# Patient Record
Sex: FEMALE | Race: WHITE | HISPANIC OR LATINO
[De-identification: names, ages, dates, MRNs, and addresses within clinical notes are randomized per-mention and may not be internally consistent; named-entity substitution may affect disease eponyms.]

---

## 2019-09-29 ENCOUNTER — TRANSCRIPTION ENCOUNTER (OUTPATIENT)
Age: 54
End: 2019-09-29

## 2021-06-09 PROBLEM — Z00.00 ENCOUNTER FOR PREVENTIVE HEALTH EXAMINATION: Status: ACTIVE | Noted: 2021-06-09

## 2021-06-11 ENCOUNTER — APPOINTMENT (OUTPATIENT)
Dept: PULMONOLOGY | Facility: CLINIC | Age: 56
End: 2021-06-11
Payer: MEDICAID

## 2021-06-11 VITALS
BODY MASS INDEX: 29.64 KG/M2 | WEIGHT: 151 LBS | TEMPERATURE: 97 F | OXYGEN SATURATION: 94 % | HEIGHT: 60 IN | DIASTOLIC BLOOD PRESSURE: 80 MMHG | RESPIRATION RATE: 16 BRPM | SYSTOLIC BLOOD PRESSURE: 142 MMHG | HEART RATE: 90 BPM

## 2021-06-11 DIAGNOSIS — Z86.39 PERSONAL HISTORY OF OTHER ENDOCRINE, NUTRITIONAL AND METABOLIC DISEASE: ICD-10-CM

## 2021-06-11 DIAGNOSIS — Z86.79 PERSONAL HISTORY OF OTHER DISEASES OF THE CIRCULATORY SYSTEM: ICD-10-CM

## 2021-06-11 DIAGNOSIS — R05 COUGH: ICD-10-CM

## 2021-06-11 PROCEDURE — 99204 OFFICE O/P NEW MOD 45 MIN: CPT

## 2021-06-12 PROBLEM — Z86.39 HISTORY OF HYPERLIPIDEMIA: Status: RESOLVED | Noted: 2021-06-12 | Resolved: 2021-06-12

## 2021-06-12 PROBLEM — Z86.79 HISTORY OF HYPERTENSION: Status: RESOLVED | Noted: 2021-06-12 | Resolved: 2021-06-12

## 2021-06-12 RX ORDER — AMLODIPINE BESYLATE 5 MG/1
5 TABLET ORAL
Qty: 30 | Refills: 0 | Status: ACTIVE | COMMUNITY
Start: 2021-03-13

## 2021-06-12 RX ORDER — HYDROCHLOROTHIAZIDE 12.5 MG/1
12.5 TABLET ORAL
Qty: 30 | Refills: 0 | Status: COMPLETED | COMMUNITY
Start: 2020-12-26

## 2021-06-12 RX ORDER — METHYLPREDNISOLONE 4 MG/1
4 TABLET ORAL
Qty: 21 | Refills: 0 | Status: COMPLETED | COMMUNITY
Start: 2021-05-05

## 2021-06-12 NOTE — HISTORY OF PRESENT ILLNESS
[Never] : never [TextBox_4] : She is a 55 year old woman with a history of hypertension and hyperlipidemia who presented with a chronic cough. No prior history of pulmonary disease. She never smoked. \par \par Has had some SOB and wheezing. Producing clear phlegm. No pets. The occupational history was unremarkable. \par \par \par  [Daytime Somnolence] : denies daytime somnolence [Snoring] : no snoring

## 2021-06-12 NOTE — PHYSICAL EXAM
[No Acute Distress] : no acute distress [Normal Oropharynx] : normal oropharynx [No Neck Mass] : no neck mass [No Resp Distress] : no resp distress [Wheeze] : wheeze [No HSM] : no hsm [Normal Gait] : normal gait [No Clubbing] : no clubbing [No Edema] : no edema [Normal Turgor] : normal turgor [No Focal Deficits] : no focal deficits [Oriented x3] : oriented x3

## 2021-06-12 NOTE — REVIEW OF SYSTEMS
[Cough] : cough [Sputum] : sputum [Dyspnea] : dyspnea [Wheezing] : wheezing [Fever] : no fever [Nasal Congestion] : no nasal congestion [Postnasal Drip] : no postnasal drip [Sinus Problems] : no sinus problems [Hemoptysis] : no hemoptysis [Chest Discomfort] : no chest discomfort [GERD] : no gerd [Rash] : no rash [Anemia] : no anemia [Headache] : no headache [Depression] : no depression [Anxiety] : no anxiety [Diabetes] : no diabetes [Thyroid Problem] : no thyroid problem

## 2021-06-12 NOTE — DISCUSSION/SUMMARY
[FreeTextEntry1] : She is a 55 year old woman with a history of hypertension and hyperlipidemia who presented with a chronic cough. No prior history of pulmonary disease. Never smoked. \par \par Wheezing was noted on examination today. The cough appears to be on the basis of reactive airways disease. She was placed on Symbicort and montelukast. To continue with albuterol as needed. \par \par A PFT will be obtained after a nasopharyngeal swab for COVID-19-PCR has been obtained and the result is negative. \par \par Follow up in one month. \par

## 2021-07-16 ENCOUNTER — LABORATORY RESULT (OUTPATIENT)
Age: 56
End: 2021-07-16

## 2021-07-16 ENCOUNTER — APPOINTMENT (OUTPATIENT)
Dept: PULMONOLOGY | Facility: CLINIC | Age: 56
End: 2021-07-16
Payer: MEDICAID

## 2021-07-16 VITALS
HEART RATE: 108 BPM | DIASTOLIC BLOOD PRESSURE: 96 MMHG | BODY MASS INDEX: 29.1 KG/M2 | SYSTOLIC BLOOD PRESSURE: 146 MMHG | TEMPERATURE: 98.9 F | WEIGHT: 149 LBS | OXYGEN SATURATION: 94 %

## 2021-07-16 PROCEDURE — 99213 OFFICE O/P EST LOW 20 MIN: CPT

## 2021-07-16 NOTE — REVIEW OF SYSTEMS
[Cough] : cough [Sputum] : sputum [Wheezing] : wheezing [Fever] : no fever [Chills] : no chills [Nasal Congestion] : no nasal congestion [Postnasal Drip] : no postnasal drip [Sinus Problems] : no sinus problems [Hemoptysis] : no hemoptysis [Chest Discomfort] : no chest discomfort [Hay Fever] : no hay fever [GERD] : no gerd [Rash] : no rash [Anemia] : no anemia [Headache] : no headache [Depression] : no depression [Anxiety] : no anxiety [Diabetes] : no diabetes [Thyroid Problem] : no thyroid problem

## 2021-07-16 NOTE — DISCUSSION/SUMMARY
[FreeTextEntry1] : She is a 55 year old woman with a history of hypertension and hyperlipidemia who presented with a chronic cough. No prior history of pulmonary disease. Never smoked. \par \par She is still wheezing but less than before. Has not started on Symbicort. On montelukast  \par \par Blood work. \par \par Start Symbicort. To continue with montelukast and albuterol as needed. A PFT will be obtained after a nasopharyngeal swab for COVID-19-PCR has been obtained and the result is negative. \par \par Follow up in two weeks. \par

## 2021-07-16 NOTE — PHYSICAL EXAM
[No Acute Distress] : no acute distress [No Neck Mass] : no neck mass [Wheeze] : wheeze [No HSM] : no hsm [Normal Gait] : normal gait [No Edema] : no edema [Normal Turgor] : normal turgor [No Focal Deficits] : no focal deficits [Oriented x3] : oriented x3 [No Cyanosis] : no cyanosis [TextBox_68] : mild wheezing

## 2021-07-16 NOTE — HISTORY OF PRESENT ILLNESS
[Never] : never [TextBox_4] : She is a 55 year old woman with a history of hypertension and hyperlipidemia who presented with a chronic cough. No prior history of pulmonary disease. She never smoked. \par \par She is still coughing. Mostly clear phlegm.\par \par No fever, chills or sweats. \par \par \par  [Daytime Somnolence] : denies daytime somnolence [Snoring] : no snoring

## 2021-07-18 LAB
A ALTERNATA IGE QN: <0.1 KUA/L
A FUMIGATUS IGE QN: <0.1 KUA/L
ALBUMIN SERPL ELPH-MCNC: 4.5 G/DL
ALP BLD-CCNC: 145 U/L
ALT SERPL-CCNC: 16 U/L
ANION GAP SERPL CALC-SCNC: 12 MMOL/L
AST SERPL-CCNC: 18 U/L
BASOPHILS # BLD AUTO: 0.05 K/UL
BASOPHILS NFR BLD AUTO: 0.8 %
BILIRUB SERPL-MCNC: 0.2 MG/DL
BUN SERPL-MCNC: 14 MG/DL
C ALBICANS IGE QN: 0.14 KUA/L
C HERBARUM IGE QN: <0.1 KUA/L
CALCIUM SERPL-MCNC: 10.3 MG/DL
CAT DANDER IGE QN: <0.1 KUA/L
CHLORIDE SERPL-SCNC: 103 MMOL/L
CO2 SERPL-SCNC: 26 MMOL/L
COMMON RAGWEED IGE QN: <0.1 KUA/L
CREAT SERPL-MCNC: 0.66 MG/DL
D FARINAE IGE QN: 4.65 KUA/L
D PTERONYSS IGE QN: 3.8 KUA/L
DEPRECATED A ALTERNATA IGE RAST QL: 0
DEPRECATED A FUMIGATUS IGE RAST QL: 0
DEPRECATED C ALBICANS IGE RAST QL: NORMAL
DEPRECATED C HERBARUM IGE RAST QL: 0
DEPRECATED CAT DANDER IGE RAST QL: 0
DEPRECATED COMMON RAGWEED IGE RAST QL: 0
DEPRECATED D FARINAE IGE RAST QL: 3
DEPRECATED D PTERONYSS IGE RAST QL: 3
DEPRECATED DOG DANDER IGE RAST QL: 0
DEPRECATED M RACEMOSUS IGE RAST QL: 0
DEPRECATED ROACH IGE RAST QL: 0
DEPRECATED TIMOTHY IGE RAST QL: 2
DEPRECATED WHITE OAK IGE RAST QL: 0
DOG DANDER IGE QN: <0.1 KUA/L
EOSINOPHIL # BLD AUTO: 0.32 K/UL
EOSINOPHIL # BLD MANUAL: 430 /UL
EOSINOPHIL NFR BLD AUTO: 4.9 %
GLUCOSE SERPL-MCNC: 112 MG/DL
HCT VFR BLD CALC: 45.8 %
HGB BLD-MCNC: 14.4 G/DL
IGE SER-MCNC: 43 KU/L
IMM GRANULOCYTES NFR BLD AUTO: 0.2 %
LYMPHOCYTES # BLD AUTO: 1.71 K/UL
LYMPHOCYTES NFR BLD AUTO: 26.4 %
M RACEMOSUS IGE QN: <0.1 KUA/L
MAN DIFF?: NORMAL
MCHC RBC-ENTMCNC: 29.4 PG
MCHC RBC-ENTMCNC: 31.4 GM/DL
MCV RBC AUTO: 93.5 FL
MONOCYTES # BLD AUTO: 0.59 K/UL
MONOCYTES NFR BLD AUTO: 9.1 %
NEUTROPHILS # BLD AUTO: 3.79 K/UL
NEUTROPHILS NFR BLD AUTO: 58.6 %
PLATELET # BLD AUTO: 350 K/UL
POTASSIUM SERPL-SCNC: 4.4 MMOL/L
PROT SERPL-MCNC: 7.3 G/DL
RBC # BLD: 4.9 M/UL
RBC # FLD: 12.9 %
ROACH IGE QN: <0.1 KUA/L
SODIUM SERPL-SCNC: 141 MMOL/L
TIMOTHY IGE QN: 0.87 KUA/L
WBC # FLD AUTO: 6.47 K/UL
WHITE OAK IGE QN: <0.1 KUA/L

## 2021-07-28 LAB — SARS-COV-2 N GENE NPH QL NAA+PROBE: NOT DETECTED

## 2021-07-30 ENCOUNTER — APPOINTMENT (OUTPATIENT)
Dept: PULMONOLOGY | Facility: CLINIC | Age: 56
End: 2021-07-30
Payer: MEDICAID

## 2021-07-30 VITALS
TEMPERATURE: 98 F | OXYGEN SATURATION: 95 % | SYSTOLIC BLOOD PRESSURE: 120 MMHG | WEIGHT: 150 LBS | BODY MASS INDEX: 29.45 KG/M2 | DIASTOLIC BLOOD PRESSURE: 80 MMHG | HEART RATE: 96 BPM | HEIGHT: 60 IN | RESPIRATION RATE: 16 BRPM

## 2021-07-30 DIAGNOSIS — R06.2 WHEEZING: ICD-10-CM

## 2021-07-30 DIAGNOSIS — J82.83 EOSINOPHILIC ASTHMA: ICD-10-CM

## 2021-07-30 PROCEDURE — 94060 EVALUATION OF WHEEZING: CPT

## 2021-07-30 PROCEDURE — 99214 OFFICE O/P EST MOD 30 MIN: CPT | Mod: 25

## 2021-07-30 PROCEDURE — 94727 GAS DIL/WSHOT DETER LNG VOL: CPT

## 2021-07-30 PROCEDURE — 94729 DIFFUSING CAPACITY: CPT

## 2021-07-30 NOTE — DISCUSSION/SUMMARY
[FreeTextEntry1] : She is a 55 year old woman with a history of hypertension, hyperlipidemia and eosinophilic asthma diagnosed in 2021. No prior history of pulmonary disease. \par \par The asthma is of recent onset. She believes it may have been triggered by the surgical masks that she has been wearing for the pandemic. PFT shows moderate disease. She was just recently started on therapy. \par \par Recommend:\par - to continue with Symbicort, montelukast and albuterol as needed. \par - monitor and control the humidity in her home.\par - control for mites\par - maintain her air conditioners as clean as possible\par \par Follow up in one month. Sooner if necessary. \par

## 2021-07-30 NOTE — PROCEDURE
[FreeTextEntry1] : Chest x-ray 4/27/21: No pulmonary disease. \par \par PFT 7/30/21: Moderate obstruction. Moderate restriction. Moderate reduction in diffusion. Significant improvement after bronchodilator noted. \par \par Eosinophil count: 430 (6/18/21).

## 2021-07-30 NOTE — REVIEW OF SYSTEMS
[Cough] : cough [Wheezing] : wheezing [Fever] : no fever [Nasal Congestion] : no nasal congestion [Postnasal Drip] : no postnasal drip [Sinus Problems] : no sinus problems [Chest Discomfort] : no chest discomfort [Hay Fever] : no hay fever [GERD] : no gerd [Rash] : no rash [Anemia] : no anemia [Headache] : no headache [Depression] : no depression [Anxiety] : no anxiety [Diabetes] : no diabetes [Thyroid Problem] : no thyroid problem

## 2021-07-30 NOTE — PHYSICAL EXAM
[No Acute Distress] : no acute distress [No Neck Mass] : no neck mass [Wheeze] : wheeze [No HSM] : no hsm [Normal Gait] : normal gait [No Cyanosis] : no cyanosis [No Edema] : no edema [No Focal Deficits] : no focal deficits [Normal Turgor] : normal turgor [Oriented x3] : oriented x3 [Normal S1, S2] : normal s1, s2 [No Abnormalities] : no abnormalities [TextBox_68] : mild wheezing

## 2021-07-30 NOTE — HISTORY OF PRESENT ILLNESS
[Never] : never [TextBox_4] : She is a 55 year old woman with a history of hypertension and hyperlipidemia who presented with a chronic cough. No prior history of pulmonary disease. She never smoked. \par \par She has been taking Symbicort and montelukast. Feels a little better. \par \par Has a cough which is worse in the morning. \par \par \par \par \par  [Daytime Somnolence] : denies daytime somnolence [Snoring] : no snoring

## 2021-09-03 ENCOUNTER — APPOINTMENT (OUTPATIENT)
Dept: PULMONOLOGY | Facility: CLINIC | Age: 56
End: 2021-09-03
Payer: MEDICAID

## 2021-09-03 VITALS
HEART RATE: 103 BPM | OXYGEN SATURATION: 94 % | BODY MASS INDEX: 29.3 KG/M2 | SYSTOLIC BLOOD PRESSURE: 140 MMHG | DIASTOLIC BLOOD PRESSURE: 80 MMHG | TEMPERATURE: 98 F | WEIGHT: 150 LBS | RESPIRATION RATE: 16 BRPM

## 2021-09-03 PROCEDURE — 99213 OFFICE O/P EST LOW 20 MIN: CPT

## 2021-09-03 NOTE — HISTORY OF PRESENT ILLNESS
[Never] : never [TextBox_4] : She is a 55 year old woman with a history of hypertension and hyperlipidemia who presented with a chronic cough. No prior history of pulmonary disease. She never smoked. \par \par She has been taking Symbicort and montelukast.\par \par She is feeling much better. \par \par \par \par \par  [Daytime Somnolence] : denies daytime somnolence [Snoring] : no snoring

## 2021-09-03 NOTE — DISCUSSION/SUMMARY
[FreeTextEntry1] : She is a 55 year old woman with a history of hypertension, hyperlipidemia and eosinophilic asthma diagnosed in 2021. No prior history of pulmonary disease. \par \par The asthma is of recent onset. She has improved with treatment. \par \par To continue with Symbicort, montelukast and albuterol as needed. She is aware of environmental control issues. \par \par Follow up in one month. A PFT will be obtained after a nasopharyngeal swab for COVID-19-PCR has been obtained and the result is negative.\par

## 2021-09-03 NOTE — PHYSICAL EXAM
[No Acute Distress] : no acute distress [No Neck Mass] : no neck mass [Normal S1, S2] : normal s1, s2 [Wheeze] : wheeze [No Abnormalities] : no abnormalities [No HSM] : no hsm [Normal Gait] : normal gait [No Cyanosis] : no cyanosis [No Edema] : no edema [Normal Turgor] : normal turgor [No Focal Deficits] : no focal deficits [Oriented x3] : oriented x3 [TextBox_68] : mild wheezing

## 2021-09-03 NOTE — REVIEW OF SYSTEMS
[Cough] : cough [Wheezing] : wheezing [Fever] : no fever [Nasal Congestion] : no nasal congestion [Postnasal Drip] : no postnasal drip [Sinus Problems] : no sinus problems [Chest Tightness] : no chest tightness [Chest Discomfort] : no chest discomfort [Hay Fever] : no hay fever [GERD] : no gerd [Rash] : no rash [Myalgias] : no myalgias [Anemia] : no anemia [Headache] : no headache [Depression] : no depression [Anxiety] : no anxiety [Diabetes] : no diabetes [Thyroid Problem] : no thyroid problem

## 2021-09-07 ENCOUNTER — RX RENEWAL (OUTPATIENT)
Age: 56
End: 2021-09-07

## 2021-10-05 LAB — SARS-COV-2 N GENE NPH QL NAA+PROBE: NOT DETECTED

## 2021-10-08 ENCOUNTER — APPOINTMENT (OUTPATIENT)
Dept: PULMONOLOGY | Facility: CLINIC | Age: 56
End: 2021-10-08
Payer: MEDICAID

## 2021-10-08 VITALS
SYSTOLIC BLOOD PRESSURE: 152 MMHG | OXYGEN SATURATION: 95 % | HEART RATE: 90 BPM | WEIGHT: 146 LBS | DIASTOLIC BLOOD PRESSURE: 92 MMHG | TEMPERATURE: 97.8 F | BODY MASS INDEX: 28.51 KG/M2

## 2021-10-08 PROCEDURE — 94060 EVALUATION OF WHEEZING: CPT

## 2021-10-08 PROCEDURE — 94729 DIFFUSING CAPACITY: CPT

## 2021-10-08 PROCEDURE — 99214 OFFICE O/P EST MOD 30 MIN: CPT | Mod: 25

## 2021-10-08 PROCEDURE — 94727 GAS DIL/WSHOT DETER LNG VOL: CPT

## 2021-10-08 NOTE — HISTORY OF PRESENT ILLNESS
[Never] : never [TextBox_4] : She is still wheezing. Not using Symbicort on a regular basis. \par \par Feels that mucus is stuck in her throat. \par \par \par \par  [Daytime Somnolence] : denies daytime somnolence [Snoring] : no snoring

## 2021-10-08 NOTE — PHYSICAL EXAM
[No Acute Distress] : no acute distress [No Neck Mass] : no neck mass [Normal S1, S2] : normal s1, s2 [Wheeze] : wheeze [No Abnormalities] : no abnormalities [No HSM] : no hsm [Normal Gait] : normal gait [No Cyanosis] : no cyanosis [No Edema] : no edema [Normal Turgor] : normal turgor [No Focal Deficits] : no focal deficits [Oriented x3] : oriented x3 [Rhonchi] : rhonchi [TextBox_68] : mild wheezing

## 2021-10-08 NOTE — REVIEW OF SYSTEMS
[Cough] : cough [Wheezing] : wheezing [Fever] : no fever [Chills] : no chills [Nasal Congestion] : no nasal congestion [Postnasal Drip] : no postnasal drip [Sinus Problems] : no sinus problems [Sputum] : sputum [Dyspnea] : dyspnea [Edema] : no edema [Hay Fever] : no hay fever [GERD] : no gerd [Myalgias] : no myalgias [Rash] : no rash [Anemia] : no anemia [Headache] : no headache [Depression] : no depression [Anxiety] : no anxiety [Diabetes] : no diabetes [Thyroid Problem] : no thyroid problem

## 2021-10-08 NOTE — DISCUSSION/SUMMARY
[FreeTextEntry1] : She is a 55 year old woman with a history of hypertension, hyperlipidemia and eosinophilic asthma diagnosed in 2021. No prior history of pulmonary disease. \par \par Her asthma is active again. Advised to use Symbicort twice a day on a regular basis, continue with montelukast and albuterol as needed. To see ENT. \par \par Discussed injectable immunotherapy with her. She may consider it in the future if needed. \par \par Follow up in one month. \par

## 2021-10-08 NOTE — PROCEDURE
[FreeTextEntry1] : Chest x-ray 4/27/21: No pulmonary disease. \par \par PFT 7/30/21: Moderate obstruction. Moderate restriction. Moderate reduction in diffusion. Significant improvement after bronchodilator noted. \par \par PFT 10/8/21: Moderate obstruction. Moderate restriction. Mild decrease in diffusion. Significant improvement after bronchodilator noted. \par \par Eosinophil count: 430 (6/18/21).

## 2021-11-05 ENCOUNTER — APPOINTMENT (OUTPATIENT)
Dept: PULMONOLOGY | Facility: CLINIC | Age: 56
End: 2021-11-05

## 2022-04-04 ENCOUNTER — RX RENEWAL (OUTPATIENT)
Age: 57
End: 2022-04-04

## 2022-11-16 ENCOUNTER — APPOINTMENT (OUTPATIENT)
Dept: PULMONOLOGY | Facility: CLINIC | Age: 57
End: 2022-11-16

## 2022-11-16 VITALS
DIASTOLIC BLOOD PRESSURE: 82 MMHG | OXYGEN SATURATION: 95 % | RESPIRATION RATE: 14 BRPM | HEART RATE: 77 BPM | SYSTOLIC BLOOD PRESSURE: 150 MMHG

## 2022-11-16 VITALS — OXYGEN SATURATION: 96 %

## 2022-11-16 PROCEDURE — 99213 OFFICE O/P EST LOW 20 MIN: CPT

## 2022-11-16 RX ORDER — ALBUTEROL SULFATE 90 UG/1
108 (90 BASE) INHALANT RESPIRATORY (INHALATION)
Qty: 1 | Refills: 2 | Status: ACTIVE | COMMUNITY
Start: 2021-05-18 | End: 1900-01-01

## 2022-11-16 NOTE — REVIEW OF SYSTEMS
[Cough] : cough [Dyspnea] : dyspnea [Wheezing] : wheezing [Pleuritic Pain] : pleuritic pain [Fever] : no fever [Fatigue] : no fatigue [Nasal Congestion] : no nasal congestion [Postnasal Drip] : no postnasal drip [Sinus Problems] : no sinus problems [Sputum] : no sputum [Edema] : no edema [Palpitations] : no palpitations [Hay Fever] : no hay fever [GERD] : no gerd [Myalgias] : no myalgias [Rash] : no rash [Anemia] : no anemia [Headache] : no headache [Depression] : no depression [Anxiety] : no anxiety [Diabetes] : no diabetes [Thyroid Problem] : no thyroid problem

## 2022-11-16 NOTE — DISCUSSION/SUMMARY
[FreeTextEntry1] : She is a 57 year old woman with a history of hypertension, hyperlipidemia and eosinophilic asthma diagnosed in 2021. No prior history of pulmonary disease. \par \par Her asthma is active now and not well controlled. She has not been using any medications. \par \par Given prednisone 20 mg daily for 5 days (has 5 extra). To resume Symbicort and albuterol as needed. Start on montelukast \par \par Get CXR report from [facility]. \par \par Follow up in one month. PFT at that time.

## 2022-11-16 NOTE — HISTORY OF PRESENT ILLNESS
[Never] : never [Difficulty Maintaining Sleep] : difficulty maintaining sleep [TextBox_4] : She has been wheezing and feels SOB at times. Has a cough.The cough is dry. No fever, chills or sweats. No sick contacts. \par \par She is not any inhalers. \par \par \par \par \par \par  [Daytime Somnolence] : denies daytime somnolence [Difficulty Initiating Sleep] : does not have difficulty initiating sleep

## 2022-12-16 ENCOUNTER — APPOINTMENT (OUTPATIENT)
Dept: PULMONOLOGY | Facility: CLINIC | Age: 57
End: 2022-12-16

## 2022-12-16 VITALS
OXYGEN SATURATION: 95 % | SYSTOLIC BLOOD PRESSURE: 146 MMHG | TEMPERATURE: 96.8 F | WEIGHT: 146 LBS | BODY MASS INDEX: 28.51 KG/M2 | DIASTOLIC BLOOD PRESSURE: 82 MMHG | HEART RATE: 93 BPM

## 2022-12-16 DIAGNOSIS — J45.901 UNSPECIFIED ASTHMA WITH (ACUTE) EXACERBATION: ICD-10-CM

## 2022-12-16 PROCEDURE — 94729 DIFFUSING CAPACITY: CPT

## 2022-12-16 PROCEDURE — 94060 EVALUATION OF WHEEZING: CPT

## 2022-12-16 PROCEDURE — 99214 OFFICE O/P EST MOD 30 MIN: CPT | Mod: 25

## 2022-12-16 PROCEDURE — 94727 GAS DIL/WSHOT DETER LNG VOL: CPT

## 2022-12-16 NOTE — HISTORY OF PRESENT ILLNESS
[Difficulty Maintaining Sleep] : difficulty maintaining sleep [Never] : never [TextBox_4] : She is feeling much better. On Symbicort, montelukast and albuterol. No complaint of cough, wheezing or shortness of breath. She never smoked. \par  [Awakes Unrefreshed] : does not awaken unrefreshed [Daytime Somnolence] : denies daytime somnolence [Difficulty Initiating Sleep] : does not have difficulty initiating sleep

## 2022-12-16 NOTE — REVIEW OF SYSTEMS
[Fatigue] : no fatigue [Nasal Congestion] : no nasal congestion [Postnasal Drip] : no postnasal drip [Sinus Problems] : no sinus problems [Cough] : no cough [Chest Tightness] : no chest tightness [Dyspnea] : no dyspnea [Pleuritic Pain] : no pleuritic pain [Wheezing] : no wheezing [Edema] : no edema [Palpitations] : no palpitations [Hay Fever] : no hay fever [GERD] : no gerd [Myalgias] : no myalgias [Rash] : no rash [Anemia] : no anemia [Headache] : no headache [Depression] : no depression [Anxiety] : no anxiety [Diabetes] : no diabetes [Thyroid Problem] : no thyroid problem

## 2022-12-16 NOTE — DISCUSSION/SUMMARY
[FreeTextEntry1] : She is a 57 year old woman with a history of hypertension, hyperlipidemia and eosinophilic asthma diagnosed in 2021. No prior history of pulmonary disease. \par \par She had an exacerbation of asthma requiring a short course of prednisone in November 2022.\par \par Her asthma is controlled.  Her pulmonary function has improved. To continue with Symbicort , montelukast and albuterol as needed.  Follow-up blood work requested.\par \par Follow up in three months. Sooner if necessary.

## 2022-12-16 NOTE — PROCEDURE
[FreeTextEntry1] : Chest x-ray 4/27/21: No pulmonary disease. \par \par PFT 7/30/21: Moderate obstruction. Moderate restriction. Moderate reduction in diffusion. Significant improvement after bronchodilator noted. \par \par PFT 10/8/21: Moderate obstruction. Moderate restriction. Mild decrease in diffusion. Significant improvement after bronchodilator noted. \par \par PFT 12/16/22: No obstruction. Mild restriction. Mild reduction in diffusion. Mild improvement after bronchodilator. \par \par Eosinophil count: 430 (6/18/21).

## 2022-12-16 NOTE — PHYSICAL EXAM
[No Acute Distress] : no acute distress [No Neck Mass] : no neck mass [Normal S1, S2] : normal s1, s2 [Wheeze] : wheeze [No Abnormalities] : no abnormalities [No HSM] : no hsm [Normal Gait] : normal gait [No Cyanosis] : no cyanosis [No Edema] : no edema [Normal Turgor] : normal turgor [No Focal Deficits] : no focal deficits [Oriented x3] : oriented x3 [Normal Rate/Rhythm] : normal rate/rhythm [No Resp Distress] : no resp distress [Clear to Auscultation Bilaterally] : clear to auscultation bilaterally [Normal Affect] : normal affect

## 2022-12-23 ENCOUNTER — LABORATORY RESULT (OUTPATIENT)
Age: 57
End: 2022-12-23

## 2022-12-29 ENCOUNTER — NON-APPOINTMENT (OUTPATIENT)
Age: 57
End: 2022-12-29

## 2022-12-29 LAB
A ALTERNATA IGE QN: <0.1 KUA/L
A FUMIGATUS IGE QN: <0.1 KUA/L
ALBUMIN SERPL ELPH-MCNC: 4.6 G/DL
ALP BLD-CCNC: 127 U/L
ALT SERPL-CCNC: 24 U/L
ANION GAP SERPL CALC-SCNC: 12 MMOL/L
AST SERPL-CCNC: 21 U/L
BASOPHILS # BLD AUTO: 0.04 K/UL
BASOPHILS NFR BLD AUTO: 0.6 %
BILIRUB SERPL-MCNC: 0.2 MG/DL
BUN SERPL-MCNC: 12 MG/DL
C ALBICANS IGE QN: 0.12 KUA/L
C HERBARUM IGE QN: <0.1 KUA/L
CALCIUM SERPL-MCNC: 10 MG/DL
CAT DANDER IGE QN: <0.1 KUA/L
CHLORIDE SERPL-SCNC: 101 MMOL/L
CO2 SERPL-SCNC: 27 MMOL/L
COMMON RAGWEED IGE QN: <0.1 KUA/L
CREAT SERPL-MCNC: 0.59 MG/DL
D FARINAE IGE QN: 3.71 KUA/L
D PTERONYSS IGE QN: 2.64 KUA/L
DEPRECATED A ALTERNATA IGE RAST QL: 0
DEPRECATED A FUMIGATUS IGE RAST QL: 0
DEPRECATED C ALBICANS IGE RAST QL: NORMAL
DEPRECATED C HERBARUM IGE RAST QL: 0
DEPRECATED CAT DANDER IGE RAST QL: 0
DEPRECATED COMMON RAGWEED IGE RAST QL: 0
DEPRECATED D FARINAE IGE RAST QL: 3
DEPRECATED D PTERONYSS IGE RAST QL: 2
DEPRECATED DOG DANDER IGE RAST QL: 0
DEPRECATED M RACEMOSUS IGE RAST QL: 0
DEPRECATED ROACH IGE RAST QL: 0
DEPRECATED TIMOTHY IGE RAST QL: 1
DEPRECATED WHITE OAK IGE RAST QL: 0
DOG DANDER IGE QN: <0.1 KUA/L
EGFR: 105 ML/MIN/1.73M2
EOSINOPHIL # BLD AUTO: 0.17 K/UL
EOSINOPHIL # BLD MANUAL: 170 /UL
EOSINOPHIL NFR BLD AUTO: 2.7 %
GLUCOSE SERPL-MCNC: 107 MG/DL
HCT VFR BLD CALC: 43.5 %
HGB BLD-MCNC: 14.3 G/DL
IMM GRANULOCYTES NFR BLD AUTO: 0.3 %
LYMPHOCYTES # BLD AUTO: 2.32 K/UL
LYMPHOCYTES NFR BLD AUTO: 36.7 %
M RACEMOSUS IGE QN: <0.1 KUA/L
MAN DIFF?: NORMAL
MCHC RBC-ENTMCNC: 30 PG
MCHC RBC-ENTMCNC: 32.9 GM/DL
MCV RBC AUTO: 91.4 FL
MONOCYTES # BLD AUTO: 0.71 K/UL
MONOCYTES NFR BLD AUTO: 11.2 %
NEUTROPHILS # BLD AUTO: 3.06 K/UL
NEUTROPHILS NFR BLD AUTO: 48.5 %
PLATELET # BLD AUTO: 341 K/UL
POTASSIUM SERPL-SCNC: 4.3 MMOL/L
PROT SERPL-MCNC: 7.6 G/DL
RBC # BLD: 4.76 M/UL
RBC # FLD: 12.8 %
ROACH IGE QN: <0.1 KUA/L
SODIUM SERPL-SCNC: 140 MMOL/L
TIMOTHY IGE QN: 0.6 KUA/L
TOTAL IGE SMQN RAST: 67 KU/L
WBC # FLD AUTO: 6.32 K/UL
WHITE OAK IGE QN: <0.1 KUA/L

## 2023-02-10 ENCOUNTER — RX RENEWAL (OUTPATIENT)
Age: 58
End: 2023-02-10

## 2023-03-15 ENCOUNTER — APPOINTMENT (OUTPATIENT)
Dept: PULMONOLOGY | Facility: CLINIC | Age: 58
End: 2023-03-15
Payer: MEDICAID

## 2023-03-15 VITALS
OXYGEN SATURATION: 97 % | SYSTOLIC BLOOD PRESSURE: 148 MMHG | HEART RATE: 77 BPM | DIASTOLIC BLOOD PRESSURE: 78 MMHG | TEMPERATURE: 97.5 F | WEIGHT: 153 LBS | BODY MASS INDEX: 29.88 KG/M2

## 2023-03-15 DIAGNOSIS — J45.40 MODERATE PERSISTENT ASTHMA, UNCOMPLICATED: ICD-10-CM

## 2023-03-15 PROCEDURE — 99214 OFFICE O/P EST MOD 30 MIN: CPT

## 2023-03-15 RX ORDER — PREDNISONE 20 MG/1
20 TABLET ORAL DAILY
Qty: 10 | Refills: 1 | Status: COMPLETED | COMMUNITY
Start: 2022-11-16 | End: 2023-03-15

## 2023-03-15 NOTE — REVIEW OF SYSTEMS
[Fatigue] : no fatigue [Nasal Congestion] : no nasal congestion [Postnasal Drip] : no postnasal drip [Sinus Problems] : no sinus problems [Cough] : no cough [Dyspnea] : no dyspnea [Wheezing] : no wheezing [Palpitations] : no palpitations [Hay Fever] : no hay fever [GERD] : no gerd [Myalgias] : no myalgias [Rash] : no rash [Anemia] : no anemia [Headache] : no headache [Depression] : no depression [Anxiety] : no anxiety [Diabetes] : no diabetes [Thyroid Problem] : no thyroid problem

## 2023-03-15 NOTE — DISCUSSION/SUMMARY
[FreeTextEntry1] : She is a 57 year old woman with a history of hypertension, hyperlipidemia and eosinophilic asthma diagnosed in 2021. No prior history of pulmonary disease. \par \par Her asthma is controlled. To continue with Symbicort , montelukast and albuterol as needed. Advised to avoid mold and mites in her living space. Should monitor and control the humidity level. May need to see an Allergist. Follow up with Neurology and PT as prescribed. \par  \par Follow up in 6 months.

## 2023-03-15 NOTE — HISTORY OF PRESENT ILLNESS
[Never] : never [Difficulty Maintaining Sleep] : difficulty maintaining sleep [TextBox_4] :  On Symbicort, montelukast and albuterol.\par \par She is feeling better. No complaint of cough, wheezing or shortness of breath. \par \par Has back pain when she coughs. Had and MRI of the back, sent by her PCP.  Was seen by Neurology.  [Awakes Unrefreshed] : does not awaken unrefreshed [Difficulty Initiating Sleep] : does not have difficulty initiating sleep

## 2023-03-15 NOTE — PHYSICAL EXAM
[No Acute Distress] : no acute distress [No Neck Mass] : no neck mass [Normal Rate/Rhythm] : normal rate/rhythm [Normal S1, S2] : normal s1, s2 [No Resp Distress] : no resp distress [Clear to Auscultation Bilaterally] : clear to auscultation bilaterally [Wheeze] : wheeze [No Abnormalities] : no abnormalities [No HSM] : no hsm [Normal Gait] : normal gait [No Cyanosis] : no cyanosis [No Edema] : no edema [Normal Turgor] : normal turgor [No Focal Deficits] : no focal deficits [Oriented x3] : oriented x3 [Normal Affect] : normal affect [Normal Appearance] : normal appearance

## 2023-09-13 ENCOUNTER — APPOINTMENT (OUTPATIENT)
Dept: PULMONOLOGY | Facility: CLINIC | Age: 58
End: 2023-09-13
Payer: MEDICAID

## 2023-09-13 VITALS
BODY MASS INDEX: 29.88 KG/M2 | OXYGEN SATURATION: 97 % | TEMPERATURE: 97.1 F | SYSTOLIC BLOOD PRESSURE: 142 MMHG | WEIGHT: 153 LBS | HEART RATE: 83 BPM | DIASTOLIC BLOOD PRESSURE: 84 MMHG

## 2023-09-13 DIAGNOSIS — J45.909 UNSPECIFIED ASTHMA, UNCOMPLICATED: ICD-10-CM

## 2023-09-13 PROCEDURE — 94729 DIFFUSING CAPACITY: CPT

## 2023-09-13 PROCEDURE — 94060 EVALUATION OF WHEEZING: CPT

## 2023-09-13 PROCEDURE — 99214 OFFICE O/P EST MOD 30 MIN: CPT | Mod: 25

## 2023-09-13 PROCEDURE — 94727 GAS DIL/WSHOT DETER LNG VOL: CPT

## 2023-09-13 RX ORDER — BUDESONIDE AND FORMOTEROL FUMARATE DIHYDRATE 160; 4.5 UG/1; UG/1
160-4.5 AEROSOL RESPIRATORY (INHALATION) TWICE DAILY
Qty: 1 | Refills: 5 | Status: ACTIVE | COMMUNITY
Start: 2021-06-11 | End: 1900-01-01

## 2023-09-13 RX ORDER — MONTELUKAST 10 MG/1
10 TABLET, FILM COATED ORAL
Qty: 90 | Refills: 1 | Status: ACTIVE | COMMUNITY
Start: 2021-06-11 | End: 1900-01-01

## 2023-09-13 RX ORDER — SIMVASTATIN 5 MG/1
5 TABLET, FILM COATED ORAL
Qty: 30 | Refills: 0 | Status: COMPLETED | COMMUNITY
Start: 2021-04-15 | End: 2023-09-13

## 2023-09-18 ENCOUNTER — NON-APPOINTMENT (OUTPATIENT)
Age: 58
End: 2023-09-18

## 2024-03-13 ENCOUNTER — APPOINTMENT (OUTPATIENT)
Dept: PULMONOLOGY | Facility: CLINIC | Age: 59
End: 2024-03-13

## 2024-07-03 ENCOUNTER — APPOINTMENT (OUTPATIENT)
Dept: PULMONOLOGY | Facility: CLINIC | Age: 59
End: 2024-07-03
Payer: MEDICAID

## 2024-07-03 VITALS
BODY MASS INDEX: 28.32 KG/M2 | DIASTOLIC BLOOD PRESSURE: 90 MMHG | SYSTOLIC BLOOD PRESSURE: 158 MMHG | HEART RATE: 88 BPM | TEMPERATURE: 97.9 F | WEIGHT: 145 LBS | OXYGEN SATURATION: 93 %

## 2024-07-03 DIAGNOSIS — R06.2 WHEEZING: ICD-10-CM

## 2024-07-03 DIAGNOSIS — J30.1 ALLERGIC RHINITIS DUE TO POLLEN: ICD-10-CM

## 2024-07-03 DIAGNOSIS — J45.909 UNSPECIFIED ASTHMA, UNCOMPLICATED: ICD-10-CM

## 2024-07-03 DIAGNOSIS — R05.3 CHRONIC COUGH: ICD-10-CM

## 2024-07-03 PROCEDURE — 99214 OFFICE O/P EST MOD 30 MIN: CPT

## 2024-07-03 RX ORDER — FLUTICASONE PROPIONATE 50 UG/1
50 SPRAY, METERED NASAL DAILY
Qty: 1 | Refills: 2 | Status: ACTIVE | COMMUNITY
Start: 2024-07-03 | End: 1900-01-01

## 2024-08-23 NOTE — PHYSICAL EXAM
[facility]/Population Health Chart Review & Patient Outreach Details For Medication Adherence Project    Reason for Outreach Encounter: 3rd Party payor non-compliance report ([employer], [employer], [employer], etc)    2.  Patient Outreach Method: Reviewed patient chart     3.   Medication in question:   Hypertension Medications               olmesartan (BENICAR) 20 MG tablet Take 1 tablet (20 mg total) by mouth once daily.               LAST FILLED: 7/31/2024 for 90 day supply    4.  Reviewed and or Updates Made To: Patient Chart    5. Outreach Outcomes and/or actions taken: Patient filled medication and is on track to be adherent    Additional Notes:        [No Acute Distress] : no acute distress [No Neck Mass] : no neck mass [Normal S1, S2] : normal s1, s2 [Wheeze] : wheeze [Rhonchi] : rhonchi [No Abnormalities] : no abnormalities [No HSM] : no hsm [Normal Gait] : normal gait [No Cyanosis] : no cyanosis [No Edema] : no edema [Normal Turgor] : normal turgor [No Focal Deficits] : no focal deficits [Oriented x3] : oriented x3 [Normal Oropharynx] : normal oropharynx

## 2025-01-17 ENCOUNTER — NON-APPOINTMENT (OUTPATIENT)
Age: 60
End: 2025-01-17